# Patient Record
(demographics unavailable — no encounter records)

---

## 2025-02-25 NOTE — HISTORY OF PRESENT ILLNESS
[FreeTextEntry1] : 20 yr [facility] doing well no issues working at a few jobs. volunteering at [employer]. Diet good sleeps well goes to gym to work out.  feels safe denies at risk behaviors feels grounded and calm. no use of albuterol no reflux issues no acute complaints.

## 2025-02-25 NOTE — PHYSICAL EXAM

## 2025-02-25 NOTE — DISCUSSION/SUMMARY
[FreeTextEntry1] : Healthy 20 yr old routine care anticipatory guidance Tdap and flu vaccines administered LISSETT, PHQ, CRAFFT all negative annual exam